# Patient Record
Sex: FEMALE | Race: BLACK OR AFRICAN AMERICAN | HISPANIC OR LATINO | Employment: OTHER | URBAN - METROPOLITAN AREA
[De-identification: names, ages, dates, MRNs, and addresses within clinical notes are randomized per-mention and may not be internally consistent; named-entity substitution may affect disease eponyms.]

---

## 2019-01-11 PROBLEM — L03.90 CELLULITIS: Status: ACTIVE | Noted: 2019-01-11

## 2023-08-09 ENCOUNTER — OFFICE VISIT (OUTPATIENT)
Dept: OBSTETRICS AND GYNECOLOGY | Facility: CLINIC | Age: 33
End: 2023-08-09
Payer: MEDICAID

## 2023-08-09 ENCOUNTER — LAB VISIT (OUTPATIENT)
Dept: LAB | Facility: HOSPITAL | Age: 33
End: 2023-08-09
Attending: STUDENT IN AN ORGANIZED HEALTH CARE EDUCATION/TRAINING PROGRAM
Payer: MEDICAID

## 2023-08-09 VITALS — SYSTOLIC BLOOD PRESSURE: 112 MMHG | BODY MASS INDEX: 56.91 KG/M2 | WEIGHT: 263 LBS | DIASTOLIC BLOOD PRESSURE: 78 MMHG

## 2023-08-09 DIAGNOSIS — O24.112 TYPE 2 DIABETES MELLITUS AFFECTING PREGNANCY IN SECOND TRIMESTER, ANTEPARTUM: ICD-10-CM

## 2023-08-09 DIAGNOSIS — Z59.00 HOMELESS: ICD-10-CM

## 2023-08-09 DIAGNOSIS — N91.0 DELAYED MENSES: ICD-10-CM

## 2023-08-09 DIAGNOSIS — J45.909 ASTHMA, UNSPECIFIED ASTHMA SEVERITY, UNSPECIFIED WHETHER COMPLICATED, UNSPECIFIED WHETHER PERSISTENT: ICD-10-CM

## 2023-08-09 DIAGNOSIS — N91.0 DELAYED MENSES: Primary | ICD-10-CM

## 2023-08-09 LAB
ANION GAP SERPL CALC-SCNC: 13 MMOL/L (ref 8–16)
B-HCG UR QL: POSITIVE
BUN SERPL-MCNC: 5 MG/DL (ref 6–20)
CALCIUM SERPL-MCNC: 9 MG/DL (ref 8.7–10.5)
CHLORIDE SERPL-SCNC: 107 MMOL/L (ref 95–110)
CO2 SERPL-SCNC: 18 MMOL/L (ref 23–29)
CREAT SERPL-MCNC: 0.6 MG/DL (ref 0.5–1.4)
CREAT UR-MCNC: 325 MG/DL (ref 15–325)
CTP QC/QA: YES
EST. GFR  (NO RACE VARIABLE): >60 ML/MIN/1.73 M^2
ESTIMATED AVG GLUCOSE: 143 MG/DL (ref 68–131)
GLUCOSE SERPL-MCNC: 113 MG/DL (ref 70–110)
HBA1C MFR BLD: 6.6 % (ref 4–5.6)
POTASSIUM SERPL-SCNC: 3.5 MMOL/L (ref 3.5–5.1)
PROT UR-MCNC: 16 MG/DL (ref 0–15)
PROT/CREAT UR: 0.05 MG/G{CREAT} (ref 0–0.2)
SODIUM SERPL-SCNC: 138 MMOL/L (ref 136–145)

## 2023-08-09 PROCEDURE — 99203 OFFICE O/P NEW LOW 30 MIN: CPT | Mod: PBBFAC,TH,PN,25 | Performed by: STUDENT IN AN ORGANIZED HEALTH CARE EDUCATION/TRAINING PROGRAM

## 2023-08-09 PROCEDURE — 99204 OFFICE O/P NEW MOD 45 MIN: CPT | Mod: S$PBB,TH,25, | Performed by: STUDENT IN AN ORGANIZED HEALTH CARE EDUCATION/TRAINING PROGRAM

## 2023-08-09 PROCEDURE — 1159F MED LIST DOCD IN RCRD: CPT | Mod: CPTII,,, | Performed by: STUDENT IN AN ORGANIZED HEALTH CARE EDUCATION/TRAINING PROGRAM

## 2023-08-09 PROCEDURE — 1159F PR MEDICATION LIST DOCUMENTED IN MEDICAL RECORD: ICD-10-PCS | Mod: CPTII,,, | Performed by: STUDENT IN AN ORGANIZED HEALTH CARE EDUCATION/TRAINING PROGRAM

## 2023-08-09 PROCEDURE — 86592 SYPHILIS TEST NON-TREP QUAL: CPT | Performed by: STUDENT IN AN ORGANIZED HEALTH CARE EDUCATION/TRAINING PROGRAM

## 2023-08-09 PROCEDURE — 99999PBSHW POCT URINE PREGNANCY: Mod: PBBFAC,,,

## 2023-08-09 PROCEDURE — 87086 URINE CULTURE/COLONY COUNT: CPT | Performed by: STUDENT IN AN ORGANIZED HEALTH CARE EDUCATION/TRAINING PROGRAM

## 2023-08-09 PROCEDURE — 86900 BLOOD TYPING SEROLOGIC ABO: CPT | Performed by: STUDENT IN AN ORGANIZED HEALTH CARE EDUCATION/TRAINING PROGRAM

## 2023-08-09 PROCEDURE — 3074F SYST BP LT 130 MM HG: CPT | Mod: CPTII,,, | Performed by: STUDENT IN AN ORGANIZED HEALTH CARE EDUCATION/TRAINING PROGRAM

## 2023-08-09 PROCEDURE — 76801 OB US < 14 WKS SINGLE FETUS: CPT | Mod: 26,S$PBB,, | Performed by: STUDENT IN AN ORGANIZED HEALTH CARE EDUCATION/TRAINING PROGRAM

## 2023-08-09 PROCEDURE — 3078F DIAST BP <80 MM HG: CPT | Mod: CPTII,,, | Performed by: STUDENT IN AN ORGANIZED HEALTH CARE EDUCATION/TRAINING PROGRAM

## 2023-08-09 PROCEDURE — 87088 URINE BACTERIA CULTURE: CPT | Performed by: STUDENT IN AN ORGANIZED HEALTH CARE EDUCATION/TRAINING PROGRAM

## 2023-08-09 PROCEDURE — 76801 PR US, OB <14WKS, TRANSABD, SINGLE GESTATION: ICD-10-PCS | Mod: 26,S$PBB,, | Performed by: STUDENT IN AN ORGANIZED HEALTH CARE EDUCATION/TRAINING PROGRAM

## 2023-08-09 PROCEDURE — 3074F PR MOST RECENT SYSTOLIC BLOOD PRESSURE < 130 MM HG: ICD-10-PCS | Mod: CPTII,,, | Performed by: STUDENT IN AN ORGANIZED HEALTH CARE EDUCATION/TRAINING PROGRAM

## 2023-08-09 PROCEDURE — 83036 HEMOGLOBIN GLYCOSYLATED A1C: CPT | Performed by: STUDENT IN AN ORGANIZED HEALTH CARE EDUCATION/TRAINING PROGRAM

## 2023-08-09 PROCEDURE — 80048 BASIC METABOLIC PNL TOTAL CA: CPT | Performed by: STUDENT IN AN ORGANIZED HEALTH CARE EDUCATION/TRAINING PROGRAM

## 2023-08-09 PROCEDURE — 86803 HEPATITIS C AB TEST: CPT | Performed by: STUDENT IN AN ORGANIZED HEALTH CARE EDUCATION/TRAINING PROGRAM

## 2023-08-09 PROCEDURE — 82570 ASSAY OF URINE CREATININE: CPT | Performed by: STUDENT IN AN ORGANIZED HEALTH CARE EDUCATION/TRAINING PROGRAM

## 2023-08-09 PROCEDURE — 99999 PR PBB SHADOW E&M-NEW PATIENT-LVL III: CPT | Mod: PBBFAC,,, | Performed by: STUDENT IN AN ORGANIZED HEALTH CARE EDUCATION/TRAINING PROGRAM

## 2023-08-09 PROCEDURE — 99999 PR PBB SHADOW E&M-NEW PATIENT-LVL III: ICD-10-PCS | Mod: PBBFAC,,, | Performed by: STUDENT IN AN ORGANIZED HEALTH CARE EDUCATION/TRAINING PROGRAM

## 2023-08-09 PROCEDURE — 36415 COLL VENOUS BLD VENIPUNCTURE: CPT | Mod: PN | Performed by: STUDENT IN AN ORGANIZED HEALTH CARE EDUCATION/TRAINING PROGRAM

## 2023-08-09 PROCEDURE — 87077 CULTURE AEROBIC IDENTIFY: CPT | Performed by: STUDENT IN AN ORGANIZED HEALTH CARE EDUCATION/TRAINING PROGRAM

## 2023-08-09 PROCEDURE — 3008F PR BODY MASS INDEX (BMI) DOCUMENTED: ICD-10-PCS | Mod: CPTII,,, | Performed by: STUDENT IN AN ORGANIZED HEALTH CARE EDUCATION/TRAINING PROGRAM

## 2023-08-09 PROCEDURE — 85025 COMPLETE CBC W/AUTO DIFF WBC: CPT | Performed by: STUDENT IN AN ORGANIZED HEALTH CARE EDUCATION/TRAINING PROGRAM

## 2023-08-09 PROCEDURE — 87591 N.GONORRHOEAE DNA AMP PROB: CPT | Performed by: STUDENT IN AN ORGANIZED HEALTH CARE EDUCATION/TRAINING PROGRAM

## 2023-08-09 PROCEDURE — 87340 HEPATITIS B SURFACE AG IA: CPT | Performed by: STUDENT IN AN ORGANIZED HEALTH CARE EDUCATION/TRAINING PROGRAM

## 2023-08-09 PROCEDURE — 99999PBSHW POCT URINE PREGNANCY: ICD-10-PCS | Mod: PBBFAC,,,

## 2023-08-09 PROCEDURE — 3078F PR MOST RECENT DIASTOLIC BLOOD PRESSURE < 80 MM HG: ICD-10-PCS | Mod: CPTII,,, | Performed by: STUDENT IN AN ORGANIZED HEALTH CARE EDUCATION/TRAINING PROGRAM

## 2023-08-09 PROCEDURE — 87186 SC STD MICRODIL/AGAR DIL: CPT | Performed by: STUDENT IN AN ORGANIZED HEALTH CARE EDUCATION/TRAINING PROGRAM

## 2023-08-09 PROCEDURE — 81025 URINE PREGNANCY TEST: CPT | Mod: PBBFAC,PN | Performed by: STUDENT IN AN ORGANIZED HEALTH CARE EDUCATION/TRAINING PROGRAM

## 2023-08-09 PROCEDURE — 87389 HIV-1 AG W/HIV-1&-2 AB AG IA: CPT | Performed by: STUDENT IN AN ORGANIZED HEALTH CARE EDUCATION/TRAINING PROGRAM

## 2023-08-09 PROCEDURE — 86762 RUBELLA ANTIBODY: CPT | Performed by: STUDENT IN AN ORGANIZED HEALTH CARE EDUCATION/TRAINING PROGRAM

## 2023-08-09 PROCEDURE — 3008F BODY MASS INDEX DOCD: CPT | Mod: CPTII,,, | Performed by: STUDENT IN AN ORGANIZED HEALTH CARE EDUCATION/TRAINING PROGRAM

## 2023-08-09 PROCEDURE — 76801 OB US < 14 WKS SINGLE FETUS: CPT | Mod: PBBFAC,PN | Performed by: STUDENT IN AN ORGANIZED HEALTH CARE EDUCATION/TRAINING PROGRAM

## 2023-08-09 PROCEDURE — 99204 PR OFFICE/OUTPT VISIT, NEW, LEVL IV, 45-59 MIN: ICD-10-PCS | Mod: S$PBB,TH,25, | Performed by: STUDENT IN AN ORGANIZED HEALTH CARE EDUCATION/TRAINING PROGRAM

## 2023-08-09 RX ORDER — METFORMIN HYDROCHLORIDE 500 MG/1
500 TABLET ORAL
COMMUNITY
End: 2023-10-06 | Stop reason: SDUPTHER

## 2023-08-09 RX ORDER — METFORMIN HYDROCHLORIDE 1000 MG/1
1000 TABLET ORAL 2 TIMES DAILY WITH MEALS
Qty: 180 TABLET | Refills: 3 | Status: ON HOLD | OUTPATIENT
Start: 2023-08-09 | End: 2023-12-14 | Stop reason: HOSPADM

## 2023-08-09 RX ORDER — ASPIRIN 81 MG/1
81 TABLET ORAL DAILY
Qty: 150 TABLET | Refills: 2 | Status: ON HOLD | OUTPATIENT
Start: 2023-08-09 | End: 2023-12-14 | Stop reason: HOSPADM

## 2023-08-09 SDOH — SOCIAL DETERMINANTS OF HEALTH (SDOH): HOMELESSNESS UNSPECIFIED: Z59.00

## 2023-08-09 NOTE — PROGRESS NOTES
CC: Absence of menses    Yary Bermudez is a 33 y.o. female  presents with complaint of absence of menstruation.  She denies nausea/vomIting/abdominal pain/bleeding.  UPT is positive.     Transfer from [place]     Had 1T US with Dr. Garner, NATE 1/15/2024    LMP 4/10/23 [id number] EDD1/15/24    OB hx:   miscarriage   G2 miscarriage   G3 current    PMH: type 2 DM- on metformin 1000mg once daily, asthma, uses inhaler PRN, taking symbicort daily   PSH: tubes in ears   Social: denies any tobacco, Not currently working, FOB involved     Denies any family hx of genetic disorders, chromosomal abnormalities, Neural tube defects, or congenital heart defects.      Denies hx of abnormal pap smear   History reviewed. No pertinent past medical history.  History reviewed. No pertinent surgical history.  Social History     Socioeconomic History    Marital status: Single   Tobacco Use    Smoking status: Never    Smokeless tobacco: Never   Substance and Sexual Activity    Alcohol use: Not Currently    Drug use: Never    Sexual activity: Yes     Family History   Problem Relation Age of Onset    Ovarian cancer Sister     Breast cancer Neg Hx     Uterine cancer Neg Hx     Cervical cancer Neg Hx      OB History    Para Term  AB Living   2       2     SAB IAB Ectopic Multiple Live Births   2              # Outcome Date GA Lbr Skyler/2nd Weight Sex Delivery Anes PTL Lv   2  2016           1 2010               /78   Wt 119.3 kg (263 lb 0.1 oz)   LMP 04/10/2023   BMI 56.91 kg/m²     ROS:  GENERAL: Denies weight gain or weight loss. Feeling well overall.   SKIN: Denies rash or lesions.   HEAD: Denies head injury or headache.   NODES: Denies enlarged lymph nodes.   CHEST: Denies chest pain or shortness of breath.   CARDIOVASCULAR: Denies palpitations or left sided chest pain.   ABDOMEN: No abdominal pain, constipation, diarrhea, nausea, vomiting or rectal bleeding.   URINARY: No frequency,  dysuria, hematuria, or burning on urination.  REPRODUCTIVE: See HPI.   HEMATOLOGIC: No easy bruisability or excessive bleeding.   MUSCULOSKELETAL: Denies joint pain or swelling.   NEUROLOGIC: Denies syncope or weakness.   PSYCHIATRIC: Denies depression, anxiety or mood swings.    PE:   APPEARANCE: Well nourished, well developed, in no acute distress.  AFFECT: WNL, alert and oriented x 3.  SKIN: No acne or hirsutism.  NECK: Neck symmetric without masses or thyromegaly.  NODES: No inguinal, cervical, axillary or femoral lymph node enlargement.  CHEST: Good respiratory effort.   ABDOMEN: Soft. No tenderness or masses. No hepatosplenomegaly. No hernias.      ULTRASOUND:   Ultrasound performed in the usual fashion, showing viable garza intrauterine pregnancy,FHTS 156, FL consistent with 17w1d  No free fluid in cul-de-sac or adnexal pathology.    ASSESSMENT and PLAN:    ICD-10-CM ICD-9-CM    1. Delayed menses  N91.0 626.8 POCT urine pregnancy      HIV 1/2 Ag/Ab (4th Gen)      RPR      Hepatitis B Surface Antigen      Type & Screen - Ob Profile      Rubella Antibody, IgG      Urine culture      C. trachomatis/N. gonorrhoeae by AMP DNA      CBC Auto Differential      Hepatitis C Antibody      2. Type 2 diabetes mellitus affecting pregnancy in second trimester, antepartum  O24.112 648.03 Protein/Creatinine Ratio, Urine     250.00 EKG 12-lead      Hemoglobin A1C      BASIC METABOLIC PANEL      Ambulatory referral/consult to Perinatology      Prenatal Miscellaneous Test, Blood      3. Asthma, unspecified asthma severity, unspecified whether complicated, unspecified whether persistent  J45.909 493.90 Ambulatory referral/consult to Perinatology      4. BMI 50.0-59.9, adult  Z68.43 V85.43 Ambulatory referral/consult to Perinatology      5. Homeless  Z59.00 V60.0 Ambulatory referral/consult to Social Work          Orders Placed This Encounter   Procedures    Urine culture    C. trachomatis/N. gonorrhoeae by AMP DNA     Order  Specific Question:   Source:     Answer:   Urine    HIV 1/2 Ag/Ab (4th Gen)     Standing Status:   Future     Standing Expiration Date:   8/9/2024     Order Specific Question:   Release to patient     Answer:   Immediate    RPR     Standing Status:   Future     Standing Expiration Date:   8/9/2024     Order Specific Question:   Release to patient     Answer:   Immediate    Hepatitis B Surface Antigen     Standing Status:   Future     Standing Expiration Date:   8/9/2024    Rubella Antibody, IgG     Standing Status:   Future     Standing Expiration Date:   8/9/2024    CBC Auto Differential     Standing Status:   Future     Standing Expiration Date:   8/9/2024    Hepatitis C Antibody     Standing Status:   Future     Standing Expiration Date:   10/7/2024     Order Specific Question:   Release to patient     Answer:   Immediate    Protein/Creatinine Ratio, Urine     Specimen Source->Urine     Order Specific Question:   Specimen Source     Answer:   Urine    Hemoglobin A1C     Standing Status:   Future     Standing Expiration Date:   10/7/2024    BASIC METABOLIC PANEL     Standing Status:   Future     Standing Expiration Date:   10/7/2024    Prenatal Miscellaneous Test, Blood     Standing Status:   Future     Standing Expiration Date:   10/7/2024     Order Specific Question:   Misc. Genetics Test Name     Answer:   unity    Ambulatory referral/consult to Perinatology     Standing Status:   Future     Standing Expiration Date:   9/9/2024     Referral Priority:   Routine     Referral Type:   Consultation     Referral Reason:   Specialty Services Required     Requested Specialty:   Maternal and Fetal Medicine     Number of Visits Requested:   1    Ambulatory referral/consult to Social Work     Standing Status:   Future     Standing Expiration Date:   9/9/2024     Referral Priority:   Routine     Referral Type:   Consultation     Referral Reason:   Specialty Services Required     Requested Specialty:   Licensed Clinical       Number of Visits Requested:   1    POCT urine pregnancy    EKG 12-lead     Standing Status:   Future     Standing Expiration Date:   8/9/2024    Type & Screen - Ob Profile     Standing Status:   Future     Standing Expiration Date:   9/8/2023           - Patient was counseled today on proper weight gain based on the [employer]'s recommendations based on her pre-pregnancy weight. Discussed foods to avoid in pregnancy (i.e. sushi, fish that are high in mercury, deli meat, and unpasteurized cheeses). Discussed prenatal vitamin options (i.e. stool softener, DHA). Discussed genetic screening, patient desires, discussed genetic carrier screening, patient desires  - 1T labs ordered  - pap due pp   - US performed today in clinic   - NIPT today    Type 2 DM  - baseline p/c ratio, EKG ordered  - Hemoglobin a1c  - discussed risk of uncontrolled DM including birth defects, FGR, macrosomnia, fetal demise  - her fastings she says are 100-120, post pradinals 140-150, doesn't have log with her.   - discussed optimal BG control  - increase meformin to 1000mgBID for now, reassess BG in 2 weeks  - referral to [facility] for growth monitoring and level 2US  - aspirin started    BMI > 50  - [facility] US for anatomy    Asthma  - continue symbicort  - doesn't have peak flow meter, will arrange    Homeless  - will place  consult     No follow-ups on file.

## 2023-08-10 LAB
ABO + RH BLD: NORMAL
BASOPHILS # BLD AUTO: 0.03 K/UL (ref 0–0.2)
BASOPHILS NFR BLD: 0.3 % (ref 0–1.9)
BLD GP AB SCN CELLS X3 SERPL QL: NORMAL
DIFFERENTIAL METHOD: ABNORMAL
EOSINOPHIL # BLD AUTO: 0.1 K/UL (ref 0–0.5)
EOSINOPHIL NFR BLD: 1.1 % (ref 0–8)
ERYTHROCYTE [DISTWIDTH] IN BLOOD BY AUTOMATED COUNT: 13.6 % (ref 11.5–14.5)
HBV SURFACE AG SERPL QL IA: NORMAL
HCT VFR BLD AUTO: 34.8 % (ref 37–48.5)
HCV AB SERPL QL IA: NORMAL
HGB BLD-MCNC: 11.5 G/DL (ref 12–16)
HIV 1+2 AB+HIV1 P24 AG SERPL QL IA: NORMAL
IMM GRANULOCYTES # BLD AUTO: 0.05 K/UL (ref 0–0.04)
IMM GRANULOCYTES NFR BLD AUTO: 0.5 % (ref 0–0.5)
LYMPHOCYTES # BLD AUTO: 2.7 K/UL (ref 1–4.8)
LYMPHOCYTES NFR BLD: 24.2 % (ref 18–48)
MCH RBC QN AUTO: 26.9 PG (ref 27–31)
MCHC RBC AUTO-ENTMCNC: 33 G/DL (ref 32–36)
MCV RBC AUTO: 82 FL (ref 82–98)
MONOCYTES # BLD AUTO: 0.6 K/UL (ref 0.3–1)
MONOCYTES NFR BLD: 5.6 % (ref 4–15)
NEUTROPHILS # BLD AUTO: 7.6 K/UL (ref 1.8–7.7)
NEUTROPHILS NFR BLD: 68.3 % (ref 38–73)
NRBC BLD-RTO: 0 /100 WBC
PLATELET # BLD AUTO: 308 K/UL (ref 150–450)
PMV BLD AUTO: 9.7 FL (ref 9.2–12.9)
RBC # BLD AUTO: 4.27 M/UL (ref 4–5.4)
RPR SER QL: NORMAL
RUBV IGG SER-ACNC: <5 IU/ML
RUBV IGG SER-IMP: ABNORMAL
WBC # BLD AUTO: 11.09 K/UL (ref 3.9–12.7)

## 2023-08-11 ENCOUNTER — TELEPHONE (OUTPATIENT)
Dept: OBSTETRICS AND GYNECOLOGY | Facility: CLINIC | Age: 33
End: 2023-08-11
Payer: MEDICAID

## 2023-08-11 DIAGNOSIS — O24.112 TYPE 2 DIABETES MELLITUS AFFECTING PREGNANCY IN SECOND TRIMESTER, ANTEPARTUM: Primary | ICD-10-CM

## 2023-08-11 LAB
BACTERIA UR CULT: ABNORMAL
C TRACH DNA SPEC QL NAA+PROBE: NOT DETECTED
N GONORRHOEA DNA SPEC QL NAA+PROBE: NOT DETECTED

## 2023-08-11 RX ORDER — INSULIN PUMP SYRINGE, 3 ML
EACH MISCELLANEOUS
Qty: 1 EACH | Refills: 0 | Status: SHIPPED | OUTPATIENT
Start: 2023-08-11 | End: 2023-11-13 | Stop reason: SDUPTHER

## 2023-08-11 RX ORDER — LANCETS
1 EACH MISCELLANEOUS 4 TIMES DAILY
Qty: 200 EACH | Refills: 3 | Status: ON HOLD | OUTPATIENT
Start: 2023-08-11 | End: 2023-12-14 | Stop reason: HOSPADM

## 2023-08-11 RX ORDER — NITROFURANTOIN 25; 75 MG/1; MG/1
100 CAPSULE ORAL 2 TIMES DAILY
Qty: 14 CAPSULE | Refills: 0 | Status: SHIPPED | OUTPATIENT
Start: 2023-08-11 | End: 2023-08-18
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
## 2023-08-17 PROBLEM — O24.112 TYPE 2 DIABETES MELLITUS AFFECTING PREGNANCY IN SECOND TRIMESTER, ANTEPARTUM: Status: ACTIVE | Noted: 2023-08-17

## 2023-08-17 PROBLEM — O99.212 OBESITY AFFECTING PREGNANCY IN SECOND TRIMESTER: Status: ACTIVE | Noted: 2023-08-17

## 2023-08-17 PROBLEM — O09.72: Status: ACTIVE | Noted: 2023-08-17

## 2023-09-15 ENCOUNTER — TELEPHONE (OUTPATIENT)
Dept: OBSTETRICS AND GYNECOLOGY | Facility: CLINIC | Age: 33
End: 2023-09-15
Payer: MEDICAID

## 2023-09-15 NOTE — TELEPHONE ENCOUNTER
----- Message from Geovanna Lenz sent at 9/15/2023  9:58 AM CDT -----  Type:  Appointment Request    Caller is requesting an appointment.      Name of Caller:  Pt    Symptoms:  pregnant    Would the patient rather a call back or a response via [facility]?  Call back    Best Call Back Number:  [phone]    Additional Information:  Pt was confirmed pregnant by Dr Lobato but since they are so close in due date she suggested her to see someone else in office and the pt was recommended by her sister who sees Dr Pascual.   Please call back to advise. Thanks!

## 2023-10-06 ENCOUNTER — CLINICAL SUPPORT (OUTPATIENT)
Dept: PEDIATRIC CARDIOLOGY | Facility: CLINIC | Age: 33
End: 2023-10-06
Attending: OBSTETRICS & GYNECOLOGY
Payer: MEDICAID

## 2023-10-06 ENCOUNTER — OFFICE VISIT (OUTPATIENT)
Dept: PEDIATRIC CARDIOLOGY | Facility: CLINIC | Age: 33
End: 2023-10-06
Attending: PEDIATRICS
Payer: MEDICAID

## 2023-10-06 VITALS
HEIGHT: 57 IN | WEIGHT: 260.81 LBS | DIASTOLIC BLOOD PRESSURE: 67 MMHG | SYSTOLIC BLOOD PRESSURE: 105 MMHG | BODY MASS INDEX: 56.27 KG/M2 | HEART RATE: 70 BPM

## 2023-10-06 DIAGNOSIS — O99.212 SEVERE OBESITY DUE TO EXCESS CALORIES AFFECTING PREGNANCY IN SECOND TRIMESTER: ICD-10-CM

## 2023-10-06 DIAGNOSIS — E66.01 SEVERE OBESITY DUE TO EXCESS CALORIES AFFECTING PREGNANCY IN SECOND TRIMESTER: ICD-10-CM

## 2023-10-06 DIAGNOSIS — O24.112 TYPE 2 DIABETES MELLITUS AFFECTING PREGNANCY IN SECOND TRIMESTER, ANTEPARTUM: ICD-10-CM

## 2023-10-06 DIAGNOSIS — O24.112 TYPE 2 DIABETES MELLITUS AFFECTING PREGNANCY IN SECOND TRIMESTER, ANTEPARTUM: Primary | ICD-10-CM

## 2023-10-06 DIAGNOSIS — O35.BXX0 PREGNANCY COMPLICATED BY FETAL CONGENITAL HEART DISEASE, SINGLE OR UNSPECIFIED FETUS: ICD-10-CM

## 2023-10-06 PROCEDURE — 99999 PR PBB SHADOW E&M-EST. PATIENT-LVL III: CPT | Mod: PBBFAC,,, | Performed by: PEDIATRICS

## 2023-10-06 PROCEDURE — 99999 PR PBB SHADOW E&M-EST. PATIENT-LVL III: ICD-10-PCS | Mod: PBBFAC,,, | Performed by: PEDIATRICS

## 2023-10-06 PROCEDURE — 93325 DOPPLER ECHO COLOR FLOW MAPG: CPT | Mod: 26,S$PBB,, | Performed by: PEDIATRICS

## 2023-10-06 PROCEDURE — 99203 OFFICE O/P NEW LOW 30 MIN: CPT | Mod: 25,S$PBB,, | Performed by: PEDIATRICS

## 2023-10-06 PROCEDURE — 76825 ECHO EXAM OF FETAL HEART: CPT | Mod: PBBFAC | Performed by: PEDIATRICS

## 2023-10-06 PROCEDURE — 3008F PR BODY MASS INDEX (BMI) DOCUMENTED: ICD-10-PCS | Mod: CPTII,,, | Performed by: PEDIATRICS

## 2023-10-06 PROCEDURE — 76827 ECHO EXAM OF FETAL HEART: CPT | Mod: 26,S$PBB,, | Performed by: PEDIATRICS

## 2023-10-06 PROCEDURE — 3044F PR MOST RECENT HEMOGLOBIN A1C LEVEL <7.0%: ICD-10-PCS | Mod: CPTII,,, | Performed by: PEDIATRICS

## 2023-10-06 PROCEDURE — 3008F BODY MASS INDEX DOCD: CPT | Mod: CPTII,,, | Performed by: PEDIATRICS

## 2023-10-06 PROCEDURE — 3074F SYST BP LT 130 MM HG: CPT | Mod: CPTII,,, | Performed by: PEDIATRICS

## 2023-10-06 PROCEDURE — 3044F HG A1C LEVEL LT 7.0%: CPT | Mod: CPTII,,, | Performed by: PEDIATRICS

## 2023-10-06 PROCEDURE — 3074F PR MOST RECENT SYSTOLIC BLOOD PRESSURE < 130 MM HG: ICD-10-PCS | Mod: CPTII,,, | Performed by: PEDIATRICS

## 2023-10-06 PROCEDURE — 99213 OFFICE O/P EST LOW 20 MIN: CPT | Mod: PBBFAC | Performed by: PEDIATRICS

## 2023-10-06 PROCEDURE — 99203 PR OFFICE/OUTPT VISIT, NEW, LEVL III, 30-44 MIN: ICD-10-PCS | Mod: 25,S$PBB,, | Performed by: PEDIATRICS

## 2023-10-06 PROCEDURE — 76825 PR  SO2 FETAL HEART: ICD-10-PCS | Mod: 26,S$PBB,, | Performed by: PEDIATRICS

## 2023-10-06 PROCEDURE — 76825 ECHO EXAM OF FETAL HEART: CPT | Mod: 26,S$PBB,, | Performed by: PEDIATRICS

## 2023-10-06 PROCEDURE — 93325 PR DOPPLER COLOR FLOW VELOCITY MAP: ICD-10-PCS | Mod: 26,S$PBB,, | Performed by: PEDIATRICS

## 2023-10-06 PROCEDURE — 3078F PR MOST RECENT DIASTOLIC BLOOD PRESSURE < 80 MM HG: ICD-10-PCS | Mod: CPTII,,, | Performed by: PEDIATRICS

## 2023-10-06 PROCEDURE — 3078F DIAST BP <80 MM HG: CPT | Mod: CPTII,,, | Performed by: PEDIATRICS

## 2023-10-06 PROCEDURE — 76827 ECHO EXAM OF FETAL HEART: CPT | Mod: PBBFAC | Performed by: PEDIATRICS

## 2023-10-06 PROCEDURE — 1159F PR MEDICATION LIST DOCUMENTED IN MEDICAL RECORD: ICD-10-PCS | Mod: CPTII,,, | Performed by: PEDIATRICS

## 2023-10-06 PROCEDURE — 93325 DOPPLER ECHO COLOR FLOW MAPG: CPT | Mod: PBBFAC | Performed by: PEDIATRICS

## 2023-10-06 PROCEDURE — 1159F MED LIST DOCD IN RCRD: CPT | Mod: CPTII,,, | Performed by: PEDIATRICS

## 2023-10-06 PROCEDURE — 76827 PR  SO2 FETAL HEART DOPPLER: ICD-10-PCS | Mod: 26,S$PBB,, | Performed by: PEDIATRICS

## 2023-10-06 RX ORDER — BUDESONIDE AND FORMOTEROL FUMARATE DIHYDRATE 80; 4.5 UG/1; UG/1
2 AEROSOL RESPIRATORY (INHALATION) 2 TIMES DAILY
COMMUNITY
Start: 2023-09-14

## 2023-10-06 RX ORDER — NEBULIZER AND COMPRESSOR
EACH MISCELLANEOUS
COMMUNITY
Start: 2023-09-14

## 2023-10-06 RX ORDER — ASCORBIC ACID, CHOLECALCIFEROL, DL-.ALPHA.-TOCOPHEROL ACETATE, THIAMINE HYDROCHLORIDE, RIBOFLAVIN, NIACINAMIDE, PYRIDOXINE HYDROCHLORIDE, FOLIC ACID, CYANOCOBALAMIN, CALCIUM CARBONATE, FERROUS FUMARATE, ZINC OXIDE, CUPRIC SULFATE 100; 400; 30; 1.6; 1.6; 20; 3.1; 1; 12; 200; 27; 10; 2 MG/1; [IU]/1; [IU]/1; MG/1; MG/1; MG/1; MG/1; MG/1; UG/1; MG/1; MG/1; MG/1; MG/1
1 TABLET, COATED ORAL
COMMUNITY
Start: 2023-05-30

## 2023-10-06 RX ORDER — ALBUTEROL SULFATE 0.83 MG/ML
2.5 SOLUTION RESPIRATORY (INHALATION) EVERY 6 HOURS PRN
COMMUNITY
Start: 2023-09-14

## 2023-10-06 NOTE — PROGRESS NOTES
[facility] Maternal Fetal Medicine ([place]) Fetal Cardiology Clinic    Today, I had the pleasure of evaluating Charissa Bermudez who is now 33 y.o. and carrying her third pregnancy at 24 4/7 weeks gestation with an NATE of 1/15/24. She was referred for evaluation of the fetal heart due to pre-existing diabetes mellitus and suboptimal views of the fetal heart on MFM evaluation.     She is carrying a male fetus, to be named Elie.  Pregnancy thus far has been otherwise uncomplicated.     Obstetric History:    .  Her OB history is notable for two prior SABs    Past Medical History:   Diagnosis Date    ADHD          Current Outpatient Medications:     albuterol (PROVENTIL) 2.5 mg /3 mL (0.083 %) nebulizer solution, Inhale 2.5 mg into the lungs every 6 (six) hours as needed., Disp: , Rfl:     aspirin (ECOTRIN) 81 MG EC tablet, Take 1 tablet (81 mg total) by mouth once daily., Disp: 150 tablet, Rfl: 2    blood sugar diagnostic Strp, 1 each by Misc.(Non-Drug; Combo Route) route 4 (four) times daily., Disp: 200 each, Rfl: 3    blood-glucose meter kit, Use as instructed, Disp: 1 each, Rfl: 0    budesonide-formoterol 80-4.5 mcg (SYMBICORT) 80-4.5 mcg/actuation HFAA, Inhale 2 puffs into the lungs 2 (two) times daily., Disp: , Rfl:     lancets (LANCETS,THIN) Misc, 1 each by Misc.(Non-Drug; Combo Route) route 4 (four) times daily., Disp: 200 each, Rfl: 3    metFORMIN (GLUCOPHAGE) 1000 MG tablet, Take 1 tablet (1,000 mg total) by mouth 2 (two) times daily with meals., Disp: 180 tablet, Rfl: 3    nebulizer and compressor Ellie, Use as directed, Disp: , Rfl:     prenatal vit 91/iron/folic/dha (PRENATAL + DHA ORAL), Take by mouth., Disp: , Rfl:     budesonide/formoterol fumarate (SYMBICORT INHL), Inhale into the lungs., Disp: , Rfl:     metFORMIN (GLUCOPHAGE) 500 MG tablet, Take 500 mg by mouth., Disp: , Rfl:      27 mg iron- 1 mg Tab, Take 1 tablet by mouth., Disp: , Rfl:      Review of patient's allergies indicates:    Allergen Reactions    Shellfish containing products        Family History: Negative for congenital heart disease, early coronary artery disease, sudden unexplained death, connective tissues disorders, genetic syndromes, multiple miscarriages or other congenital anomalies.    Social History: Ms. Charissa Bermudez is in a relationship with the father of the baby.  The father of the baby is involved.     FETAL ECHOCARDIOGRAM (summary):  Fetal echo at 25 4/7wga. NATE 1/15/24.   Technically difficult study  Normal segmental anatomy.   There is mild tricuspid valve insufficiency.   Cannot rule out ventricular septal defect.  Normal right and left ventricular structure and size.   Normal biventricular systolic function.  No pericardial effusion.  There is no pericardial effusion.  (Full report in electronic medical record)    Impression:  Single active male fetus at 25 4/7 wga.  The fetal echocardiogram is notable for normal segmental anatomy.  There is mild tricuspid valve insufficiency.  The right and left ventricles appear normal in size and function.  The study was technically difficult. Some images were concerning for ventricular septal defect, however can not rule out ultrasound drop out.     I discussed with her that fetal echocardiography is insufficiently sensitive to rule out all septal defects, anomalies of pulmonary and systemic veins, arch anomalies, and some valvar abnormalities, nor can it ensure that the ductus arteriosus and foramen ovale will spontaneously close.     Recommendations:  Location, timing, and mode of delivery will be determined by the obstetrical team.  Based on the difficulty in obtaining adequate images of the fetus, I do not feel that fetal cardiology follow-up will be beneficial.  I am happy to see her again however if concerns arise.     The patient is planning to deliver at [place].  I recommend a  echocardiogram of the infant ideally at greater than 24 hours of age  and prior to hospital discharge.  Sooner if there are concerns.           Daphney Sharpe MD, MSCI  Pediatric Cardiology  Pediatric Echocardiography, Fetal Echocardiography, Cardiac MRI  [facility] [address] [place]  [address]  Phone [phone], Fax [phone]        The above information was discussed in detail including the use of diagrams, with 30 minutes of total face to face time, with greater than 50% with counseling and coordination of care.  The discussion of the diagnosis and treatment options is as described above.

## 2023-10-26 ENCOUNTER — ROUTINE PRENATAL (OUTPATIENT)
Dept: OBSTETRICS AND GYNECOLOGY | Facility: CLINIC | Age: 33
End: 2023-10-26
Payer: MEDICAID

## 2023-10-26 VITALS — DIASTOLIC BLOOD PRESSURE: 62 MMHG | WEIGHT: 265 LBS | BODY MASS INDEX: 57.33 KG/M2 | SYSTOLIC BLOOD PRESSURE: 98 MMHG

## 2023-10-26 DIAGNOSIS — Z3A.28 28 WEEKS GESTATION OF PREGNANCY: Primary | ICD-10-CM

## 2023-10-26 LAB
BILIRUBIN, UA POC OHS: NEGATIVE
BLOOD, UA POC OHS: NEGATIVE
CLARITY, UA POC OHS: CLEAR
COLOR, UA POC OHS: YELLOW
GLUCOSE, UA POC OHS: NEGATIVE
KETONES, UA POC OHS: ABNORMAL
LEUKOCYTES, UA POC OHS: ABNORMAL
NITRITE, UA POC OHS: NEGATIVE
PH, UA POC OHS: 7
PROTEIN, UA POC OHS: NEGATIVE
SPECIFIC GRAVITY, UA POC OHS: 1.02
UROBILINOGEN, UA POC OHS: 0.2

## 2023-10-26 PROCEDURE — 99999 PR PBB SHADOW E&M-EST. PATIENT-LVL III: ICD-10-PCS | Mod: PBBFAC,,, | Performed by: OBSTETRICS & GYNECOLOGY

## 2023-10-26 PROCEDURE — 99214 OFFICE O/P EST MOD 30 MIN: CPT | Mod: TH,S$PBB,, | Performed by: OBSTETRICS & GYNECOLOGY

## 2023-10-26 PROCEDURE — 81003 URINALYSIS AUTO W/O SCOPE: CPT | Mod: PBBFAC,PN | Performed by: OBSTETRICS & GYNECOLOGY

## 2023-10-26 PROCEDURE — 99999 PR PBB SHADOW E&M-EST. PATIENT-LVL III: CPT | Mod: PBBFAC,,, | Performed by: OBSTETRICS & GYNECOLOGY

## 2023-10-26 PROCEDURE — 99213 OFFICE O/P EST LOW 20 MIN: CPT | Mod: PBBFAC,PN | Performed by: OBSTETRICS & GYNECOLOGY

## 2023-10-26 PROCEDURE — 99214 PR OFFICE/OUTPT VISIT, EST, LEVL IV, 30-39 MIN: ICD-10-PCS | Mod: TH,S$PBB,, | Performed by: OBSTETRICS & GYNECOLOGY

## 2023-10-26 PROCEDURE — 99999PBSHW POCT URINALYSIS(INSTRUMENT): Mod: PBBFAC,,,

## 2023-10-26 PROCEDURE — 99999PBSHW POCT URINALYSIS(INSTRUMENT): ICD-10-PCS | Mod: PBBFAC,,,

## 2023-10-26 NOTE — PROGRESS NOTES
The patient presents with No complaints.   Reports: Good fetal movements reported, No Bleeding or pains    10/26/2023  33 y.o. 28w3d Estimated Date of Delivery: 1/15/24, dating reviewed.   OB History    Para Term  AB Living   3 0 0 0 2     SAB IAB Ectopic Multiple Live Births   2 0 0          # Outcome Date GA Lbr Skyler/2nd Weight Sex Delivery Anes PTL Lv   3 Current            2 2016           1 2010               Prenatal labs reviewed and updated today    Review of Systems:  General ROS: negative for headache or visual changes  Breast ROS: negative for breast lumps  Gastrointestinal ROS: negative for  constipation, diarrhea or nausea/vomiting  Musculoskeletal ROS: negative for pain in joints or swelling in face or hands.   Neurological ROS: negative for - headaches, numbness/tingling or visual changes      Physical Exam:  BP 98/62   Wt 120.2 kg (264 lb 15.9 oz)   LMP 04/10/2023   BMI 57.33 kg/m²   Urine Dip: Pending  Fundal Height: 30cm  Fetal Heart Tones: 147bpm  Constitutional: She is oriented to person, place, and time. She appears well-developed and well-nourished. No distress. Overweight   Cardiovascular: Normal rate.    Pulmonary/Chest: Effort normal. No respiratory distress  Abdominal: Soft, gravid, nontender. No rebound and no guarding.     Genitourinary: Deferred    Musculoskeletal: Normal range of motion, Minimal peripheral edema.   Neurological: She is alert and oriented to person, place, and time. Coordination normal.   Skin: Skin is warm and dry. She is not diaphoretic.  Psychiatric: She has a normal mood and affect.        Assessment:  33 y.o., at 28w3d Gestation   Patient Active Problem List   Diagnosis    Cellulitis    Type 2 diabetes mellitus affecting pregnancy in second trimester, antepartum    Obesity affecting pregnancy in second trimester    Encounter for supervision of high risk pregnancy due to social problem in second trimester, antepartum    Possible fetal VSD  - infant will need  echo     Current Outpatient Medications on File Prior to Visit   Medication Sig Dispense Refill    albuterol (PROVENTIL) 2.5 mg /3 mL (0.083 %) nebulizer solution Inhale 2.5 mg into the lungs every 6 (six) hours as needed.      aspirin (ECOTRIN) 81 MG EC tablet Take 1 tablet (81 mg total) by mouth once daily. 150 tablet 2    blood sugar diagnostic Strp 1 each by Misc.(Non-Drug; Combo Route) route 4 (four) times daily. 200 each 3    blood-glucose meter kit Use as instructed 1 each 0    budesonide-formoterol 80-4.5 mcg (SYMBICORT) 80-4.5 mcg/actuation HFAA Inhale 2 puffs into the lungs 2 (two) times daily.      budesonide/formoterol fumarate (SYMBICORT INHL) Inhale into the lungs.      lancets (LANCETS,THIN) Misc 1 each by Misc.(Non-Drug; Combo Route) route 4 (four) times daily. 200 each 3    metFORMIN (GLUCOPHAGE) 1000 MG tablet Take 1 tablet (1,000 mg total) by mouth 2 (two) times daily with meals. 180 tablet 3    nebulizer and compressor Ellie Use as directed      prenatal vit 91/iron/folic/dha (PRENATAL + DHA ORAL) Take by mouth.       27 mg iron- 1 mg Tab Take 1 tablet by mouth.       No current facility-administered medications on file prior to visit.         Plan:   Labs today: none  Orders today: glucola 2 weeks  MEds today: none  Procedures Today: none  Follow up 2 Weeks, bleeding/pain precautions ,  kick counts, labor precautions

## 2023-11-10 ENCOUNTER — ROUTINE PRENATAL (OUTPATIENT)
Dept: OBSTETRICS AND GYNECOLOGY | Facility: CLINIC | Age: 33
End: 2023-11-10
Payer: MEDICAID

## 2023-11-10 ENCOUNTER — LAB VISIT (OUTPATIENT)
Dept: LAB | Facility: HOSPITAL | Age: 33
End: 2023-11-10
Attending: OBSTETRICS & GYNECOLOGY
Payer: MEDICAID

## 2023-11-10 VITALS
SYSTOLIC BLOOD PRESSURE: 110 MMHG | WEIGHT: 265.88 LBS | DIASTOLIC BLOOD PRESSURE: 60 MMHG | BODY MASS INDEX: 57.52 KG/M2

## 2023-11-10 DIAGNOSIS — Z3A.30 30 WEEKS GESTATION OF PREGNANCY: Primary | ICD-10-CM

## 2023-11-10 DIAGNOSIS — Z3A.28 28 WEEKS GESTATION OF PREGNANCY: ICD-10-CM

## 2023-11-10 DIAGNOSIS — O36.63X0 EXCESSIVE FETAL GROWTH AFFECTING MANAGEMENT OF PREGNANCY IN THIRD TRIMESTER, SINGLE OR UNSPECIFIED FETUS: ICD-10-CM

## 2023-11-10 LAB
BILIRUBIN, UA POC OHS: NEGATIVE
BLOOD, UA POC OHS: NEGATIVE
CLARITY, UA POC OHS: CLEAR
COLOR, UA POC OHS: YELLOW
ERYTHROCYTE [DISTWIDTH] IN BLOOD BY AUTOMATED COUNT: 12.3 % (ref 11.5–14.5)
GLUCOSE SERPL-MCNC: 225 MG/DL (ref 70–140)
GLUCOSE, UA POC OHS: NEGATIVE
HCT VFR BLD AUTO: 32.4 % (ref 37–48.5)
HGB BLD-MCNC: 10.6 G/DL (ref 12–16)
IRON SERPL-MCNC: 26 UG/DL (ref 30–160)
KETONES, UA POC OHS: NEGATIVE
LEUKOCYTES, UA POC OHS: ABNORMAL
MCH RBC QN AUTO: 26.7 PG (ref 27–31)
MCHC RBC AUTO-ENTMCNC: 32.7 G/DL (ref 32–36)
MCV RBC AUTO: 82 FL (ref 82–98)
NITRITE, UA POC OHS: NEGATIVE
PH, UA POC OHS: 6
PLATELET # BLD AUTO: 287 K/UL (ref 150–450)
PMV BLD AUTO: 10 FL (ref 9.2–12.9)
PROTEIN, UA POC OHS: ABNORMAL
RBC # BLD AUTO: 3.97 M/UL (ref 4–5.4)
SATURATED IRON: 5 % (ref 20–50)
SPECIFIC GRAVITY, UA POC OHS: >=1.03
TOTAL IRON BINDING CAPACITY: 493 UG/DL (ref 250–450)
TRANSFERRIN SERPL-MCNC: 333 MG/DL (ref 200–375)
UROBILINOGEN, UA POC OHS: 1
WBC # BLD AUTO: 9.69 K/UL (ref 3.9–12.7)

## 2023-11-10 PROCEDURE — 36415 COLL VENOUS BLD VENIPUNCTURE: CPT | Mod: PN | Performed by: OBSTETRICS & GYNECOLOGY

## 2023-11-10 PROCEDURE — 99999PBSHW POCT URINALYSIS(INSTRUMENT): Mod: PBBFAC,,,

## 2023-11-10 PROCEDURE — 99213 OFFICE O/P EST LOW 20 MIN: CPT | Mod: TH,S$PBB,, | Performed by: OBSTETRICS & GYNECOLOGY

## 2023-11-10 PROCEDURE — 87086 URINE CULTURE/COLONY COUNT: CPT | Performed by: OBSTETRICS & GYNECOLOGY

## 2023-11-10 PROCEDURE — 83540 ASSAY OF IRON: CPT | Performed by: OBSTETRICS & GYNECOLOGY

## 2023-11-10 PROCEDURE — 99999 PR PBB SHADOW E&M-EST. PATIENT-LVL III: CPT | Mod: PBBFAC,,, | Performed by: OBSTETRICS & GYNECOLOGY

## 2023-11-10 PROCEDURE — 85027 COMPLETE CBC AUTOMATED: CPT | Performed by: OBSTETRICS & GYNECOLOGY

## 2023-11-10 PROCEDURE — 99999PBSHW POCT URINALYSIS(INSTRUMENT): ICD-10-PCS | Mod: PBBFAC,,,

## 2023-11-10 PROCEDURE — 81003 URINALYSIS AUTO W/O SCOPE: CPT | Mod: PBBFAC,PN | Performed by: OBSTETRICS & GYNECOLOGY

## 2023-11-10 PROCEDURE — 82950 GLUCOSE TEST: CPT | Performed by: OBSTETRICS & GYNECOLOGY

## 2023-11-10 PROCEDURE — 99999 PR PBB SHADOW E&M-EST. PATIENT-LVL III: ICD-10-PCS | Mod: PBBFAC,,, | Performed by: OBSTETRICS & GYNECOLOGY

## 2023-11-10 PROCEDURE — 84466 ASSAY OF TRANSFERRIN: CPT | Performed by: OBSTETRICS & GYNECOLOGY

## 2023-11-10 PROCEDURE — 99213 PR OFFICE/OUTPT VISIT, EST, LEVL III, 20-29 MIN: ICD-10-PCS | Mod: TH,S$PBB,, | Performed by: OBSTETRICS & GYNECOLOGY

## 2023-11-10 PROCEDURE — 99213 OFFICE O/P EST LOW 20 MIN: CPT | Mod: PBBFAC,TH,PN | Performed by: OBSTETRICS & GYNECOLOGY

## 2023-11-10 NOTE — PROGRESS NOTES
The patient presents with No complaints.   Reports: Good fetal movements reported, No Bleeding or pains    11/10/2023  33 y.o. 30w4d Estimated Date of Delivery: 1/15/24, dating reviewed.   OB History    Para Term  AB Living   3 0 0 0 2     SAB IAB Ectopic Multiple Live Births   2 0 0          # Outcome Date GA Lbr Skyler/2nd Weight Sex Delivery Anes PTL Lv   3 Current            2 2016           1 2010               Prenatal labs reviewed and updated today    Review of Systems:  General ROS: negative for headache or visual changes  Breast ROS: negative for breast lumps  Gastrointestinal ROS: negative for  constipation, diarrhea or nausea/vomiting  Musculoskeletal ROS: negative for pain in joints or swelling in face or hands.   Neurological ROS: negative for - headaches, numbness/tingling or visual changes      Physical Exam:  /60   Wt 120.6 kg (265 lb 14 oz)   LMP 04/10/2023   BMI 57.52 kg/m²   Urine Dip: Pending  Fundal Height:35cm  Fetal Heart Tones: 138bpm  Constitutional: She is oriented to person, place, and time. She appears well-developed and well-nourished. No distress. Overweight   Cardiovascular: Normal rate.    Pulmonary/Chest: Effort normal. No respiratory distress  Abdominal: Soft, gravid, nontender. No rebound and no guarding.     Genitourinary: Deferred     Musculoskeletal: Normal range of motion, Minimal peripheral edema.   Neurological: She is alert and oriented to person, place, and time. Coordination normal.   Skin: Skin is warm and dry. She is not diaphoretic.  Psychiatric: She has a normal mood and affect.        Assessment:  33 y.o., at 30w4d Gestation   Patient Active Problem List   Diagnosis    Cellulitis    Type 2 diabetes mellitus affecting pregnancy in second trimester, antepartum    Obesity affecting pregnancy in second trimester    Encounter for supervision of high risk pregnancy due to social problem in second trimester, antepartum    Possible fetal VSD  - infant will need  echo     Current Outpatient Medications on File Prior to Visit   Medication Sig Dispense Refill    albuterol (PROVENTIL) 2.5 mg /3 mL (0.083 %) nebulizer solution Inhale 2.5 mg into the lungs every 6 (six) hours as needed.      aspirin (ECOTRIN) 81 MG EC tablet Take 1 tablet (81 mg total) by mouth once daily. 150 tablet 2    blood sugar diagnostic Strp 1 each by Misc.(Non-Drug; Combo Route) route 4 (four) times daily. 200 each 3    blood-glucose meter kit Use as instructed 1 each 0    budesonide-formoterol 80-4.5 mcg (SYMBICORT) 80-4.5 mcg/actuation HFAA Inhale 2 puffs into the lungs 2 (two) times daily.      budesonide/formoterol fumarate (SYMBICORT INHL) Inhale into the lungs.      lancets (LANCETS,THIN) Misc 1 each by Misc.(Non-Drug; Combo Route) route 4 (four) times daily. 200 each 3    metFORMIN (GLUCOPHAGE) 1000 MG tablet Take 1 tablet (1,000 mg total) by mouth 2 (two) times daily with meals. 180 tablet 3    nebulizer and compressor Ellie Use as directed      prenatal vit 91/iron/folic/dha (PRENATAL + DHA ORAL) Take by mouth.       27 mg iron- 1 mg Tab Take 1 tablet by mouth.       No current facility-administered medications on file prior to visit.         Plan:   Labs today: glucola today  Orders today: none  MEds today: none  Procedures Today: u/s 2 weeks for S>De  Follow up 2 Weeks, bleeding/pain precautions , kick counts, labor precautions

## 2023-11-11 LAB
BACTERIA UR CULT: NORMAL
BACTERIA UR CULT: NORMAL

## 2023-11-13 ENCOUNTER — PATIENT MESSAGE (OUTPATIENT)
Dept: OBSTETRICS AND GYNECOLOGY | Facility: CLINIC | Age: 33
End: 2023-11-13
Payer: MEDICAID

## 2023-11-13 ENCOUNTER — TELEPHONE (OUTPATIENT)
Dept: OBSTETRICS AND GYNECOLOGY | Facility: CLINIC | Age: 33
End: 2023-11-13

## 2023-11-13 DIAGNOSIS — O24.415 GESTATIONAL DIABETES MELLITUS (GDM) IN THIRD TRIMESTER CONTROLLED ON ORAL HYPOGLYCEMIC DRUG: Primary | ICD-10-CM

## 2023-11-13 RX ORDER — GLYBURIDE 2.5 MG/1
2.5 TABLET ORAL 2 TIMES DAILY WITH MEALS
Qty: 60 TABLET | Refills: 3 | Status: SHIPPED | OUTPATIENT
Start: 2023-11-13 | End: 2023-12-05

## 2023-11-13 RX ORDER — INSULIN PUMP SYRINGE, 3 ML
EACH MISCELLANEOUS
Qty: 1 EACH | Refills: 0 | Status: ON HOLD | OUTPATIENT
Start: 2023-11-13 | End: 2023-12-14 | Stop reason: HOSPADM

## 2023-11-13 NOTE — TELEPHONE ENCOUNTER
Glucola 225 and iron def anemia.    Recommend diabteic teaching and fating and 2 hour after meals blood sugar testing.   Referral in for diabetic teaching.   Start glyburide twice a day.   Prescription sent in to pharmacy

## 2023-11-17 ENCOUNTER — TELEPHONE (OUTPATIENT)
Dept: OBSTETRICS AND GYNECOLOGY | Facility: CLINIC | Age: 33
End: 2023-11-17
Payer: MEDICAID

## 2023-11-17 NOTE — TELEPHONE ENCOUNTER
----- Message from Fiona Lewis sent at 11/17/2023 10:54 AM CST -----  Regarding: Concerned about baby  Good morning,    Patient is concerned about her baby has possibly dropped and she is experiencing pressure in her pelvis and with walking. She needs some clinical advice. Please follow up with patient. Thanks so much!

## 2023-11-17 NOTE — TELEPHONE ENCOUNTER
Spoke with patient. Feeling a lot of pressure in hips and back for 2 days. No spotting or bleeding. Baby is moving frequently. No pain/burning with urination. Recommend: Rest, elevate feet,  drink plenty of fluids, if any changes/worsens to ER for eval. Patient verb understanding.

## 2023-11-28 ENCOUNTER — HOSPITAL ENCOUNTER (OUTPATIENT)
Dept: RADIOLOGY | Facility: HOSPITAL | Age: 33
Discharge: HOME | End: 2023-11-28
Payer: MEDICAID

## 2023-11-28 ENCOUNTER — ROUTINE PRENATAL (OUTPATIENT)
Dept: OBSTETRICS AND GYNECOLOGY | Facility: CLINIC | Age: 33
End: 2023-11-28
Payer: MEDICAID

## 2023-11-28 VITALS
SYSTOLIC BLOOD PRESSURE: 104 MMHG | BODY MASS INDEX: 58.76 KG/M2 | DIASTOLIC BLOOD PRESSURE: 62 MMHG | WEIGHT: 271.63 LBS

## 2023-11-28 DIAGNOSIS — O36.63X0 EXCESSIVE FETAL GROWTH AFFECTING MANAGEMENT OF PREGNANCY IN THIRD TRIMESTER, SINGLE OR UNSPECIFIED FETUS: ICD-10-CM

## 2023-11-28 DIAGNOSIS — Z3A.30 30 WEEKS GESTATION OF PREGNANCY: ICD-10-CM

## 2023-11-28 DIAGNOSIS — Z3A.33 33 WEEKS GESTATION OF PREGNANCY: Primary | ICD-10-CM

## 2023-11-28 LAB
BILIRUBIN, UA POC OHS: NEGATIVE
BLOOD, UA POC OHS: ABNORMAL
CLARITY, UA POC OHS: CLEAR
COLOR, UA POC OHS: YELLOW
GLUCOSE, UA POC OHS: NEGATIVE
KETONES, UA POC OHS: ABNORMAL
LEUKOCYTES, UA POC OHS: ABNORMAL
NITRITE, UA POC OHS: NEGATIVE
PH, UA POC OHS: 6.5
PROTEIN, UA POC OHS: ABNORMAL
SPECIFIC GRAVITY, UA POC OHS: 1.02
UROBILINOGEN, UA POC OHS: 1

## 2023-11-28 PROCEDURE — 59025 FETAL NON-STRESS TEST: CPT | Mod: PBBFAC,PN | Performed by: OBSTETRICS & GYNECOLOGY

## 2023-11-28 PROCEDURE — 81003 URINALYSIS AUTO W/O SCOPE: CPT | Mod: PBBFAC,PN | Performed by: OBSTETRICS & GYNECOLOGY

## 2023-11-28 PROCEDURE — 76816 OB US FOLLOW-UP PER FETUS: CPT | Mod: TC,PN

## 2023-11-28 PROCEDURE — 59025 PR FETAL 2N-STRESS TEST: ICD-10-PCS | Mod: 26,S$PBB,, | Performed by: OBSTETRICS & GYNECOLOGY

## 2023-11-28 PROCEDURE — 99999PBSHW POCT URINALYSIS(INSTRUMENT): ICD-10-PCS | Mod: PBBFAC,,,

## 2023-11-28 PROCEDURE — 99212 OFFICE O/P EST SF 10 MIN: CPT | Mod: PBBFAC,PN,25 | Performed by: OBSTETRICS & GYNECOLOGY

## 2023-11-28 PROCEDURE — 99999PBSHW POCT URINALYSIS(INSTRUMENT): Mod: PBBFAC,,,

## 2023-11-28 PROCEDURE — 76816 OB US FOLLOW-UP PER FETUS: CPT | Mod: 26,,, | Performed by: RADIOLOGY

## 2023-11-28 PROCEDURE — 99999 PR PBB SHADOW E&M-EST. PATIENT-LVL II: ICD-10-PCS | Mod: PBBFAC,,, | Performed by: OBSTETRICS & GYNECOLOGY

## 2023-11-28 PROCEDURE — 76816 US OB FOLLOW UP EA GESTATION TRANSABDOMINAL: ICD-10-PCS | Mod: 26,,, | Performed by: RADIOLOGY

## 2023-11-28 PROCEDURE — 99999 PR PBB SHADOW E&M-EST. PATIENT-LVL II: CPT | Mod: PBBFAC,,, | Performed by: OBSTETRICS & GYNECOLOGY

## 2023-11-28 PROCEDURE — 59025 FETAL NON-STRESS TEST: CPT | Mod: 26,S$PBB,, | Performed by: OBSTETRICS & GYNECOLOGY

## 2023-11-28 PROCEDURE — 87086 URINE CULTURE/COLONY COUNT: CPT | Performed by: OBSTETRICS & GYNECOLOGY

## 2023-11-28 PROCEDURE — 99213 OFFICE O/P EST LOW 20 MIN: CPT | Mod: TH,25,S$PBB, | Performed by: OBSTETRICS & GYNECOLOGY

## 2023-11-28 PROCEDURE — 99213 PR OFFICE/OUTPT VISIT, EST, LEVL III, 20-29 MIN: ICD-10-PCS | Mod: TH,25,S$PBB, | Performed by: OBSTETRICS & GYNECOLOGY

## 2023-11-28 NOTE — PROGRESS NOTES
The patient presents with No complaints   Bs fating 90s-100s  Postprandials 120s. .   Reports: Good fetal movements reported, No Bleeding or pains    2023  33 y.o. 33w1d Estimated Date of Delivery: 1/15/24, dating reviewed.   OB History    Para Term  AB Living   3 0 0 0 2     SAB IAB Ectopic Multiple Live Births   2 0 0          # Outcome Date GA Lbr Skyler/2nd Weight Sex Delivery Anes PTL Lv   3 Current            2  2016           1 2010               Prenatal labs reviewed and updated today    Review of Systems:  General ROS: negative for headache or visual changes  Breast ROS: negative for breast lumps  Gastrointestinal ROS: negative for  constipation, diarrhea or nausea/vomiting  Musculoskeletal ROS: negative for pain in joints or swelling in face or hands.   Neurological ROS: negative for - headaches, numbness/tingling or visual changes      Physical Exam:  /62   Wt 123.2 kg (271 lb 9.7 oz)   LMP 04/10/2023   BMI 58.76 kg/m²   Urine Dip: Pending  Fundal Height:38cm  Fetal Heart Tones: 142bpm  Constitutional: She is oriented to person, place, and time. She appears well-developed and well-nourished. No distress. Overweight   Cardiovascular: Normal rate.    Pulmonary/Chest: Effort normal. No respiratory distress  Abdominal: Soft, gravid, nontender. No rebound and no guarding.     Genitourinary: Deferred    Musculoskeletal: Normal range of motion, Minimal peripheral edema.   Neurological: She is alert and oriented to person, place, and time. Coordination normal.   Skin: Skin is warm and dry. She is not diaphoretic.  Psychiatric: She has a normal mood and affect.        Assessment:  33 y.o., at 33w1d Gestation   Patient Active Problem List   Diagnosis    Cellulitis    Type 2 diabetes mellitus affecting pregnancy in second trimester, antepartum    Obesity affecting pregnancy in second trimester    Encounter for supervision of high risk pregnancy due to social problem in second  trimester, antepartum    Possible fetal VSD - infant will need  echo     Current Outpatient Medications on File Prior to Visit   Medication Sig Dispense Refill    albuterol (PROVENTIL) 2.5 mg /3 mL (0.083 %) nebulizer solution Inhale 2.5 mg into the lungs every 6 (six) hours as needed.      aspirin (ECOTRIN) 81 MG EC tablet Take 1 tablet (81 mg total) by mouth once daily. 150 tablet 2    blood sugar diagnostic Strp 1 each by Misc.(Non-Drug; Combo Route) route 4 (four) times daily. 200 each 3    blood-glucose meter kit Use as instructed 1 each 0    budesonide-formoterol 80-4.5 mcg (SYMBICORT) 80-4.5 mcg/actuation HFAA Inhale 2 puffs into the lungs 2 (two) times daily.      budesonide/formoterol fumarate (SYMBICORT INHL) Inhale into the lungs.      glyBURIDE (DIABETA) 2.5 MG tablet Take 1 tablet (2.5 mg total) by mouth 2 (two) times daily with meals. 60 tablet 3    lancets (LANCETS,THIN) Misc 1 each by Misc.(Non-Drug; Combo Route) route 4 (four) times daily. 200 each 3    metFORMIN (GLUCOPHAGE) 1000 MG tablet Take 1 tablet (1,000 mg total) by mouth 2 (two) times daily with meals. 180 tablet 3    nebulizer and compressor Ellie Use as directed      prenatal vit 91/iron/folic/dha (PRENATAL + DHA ORAL) Take by mouth.       27 mg iron- 1 mg Tab Take 1 tablet by mouth.       No current facility-administered medications on file prior to visit.         Plan:   Labs today: none  Orders today: none  MEds today: none  Procedures Today: weekly NST, u/s today  Follow up 1 Weeks, bleeding/pain precautions ,  kick counts, labor precautions

## 2023-11-28 NOTE — PROCEDURES
Procedures    FETAL SURVEILLANCE TESTING SUMMARY      INDICATIONS:  gestational diabetes mellitus    Fetal doppler heart rate tracing obtained in the usual fashion with the patient in the left supine position.    Duration of monitorinmin    OBJECTIVE RESULTS:  Baseline fetal heart rate: 140bpm    Fetal heart variability: moderate  Fetal Heart Rate decelerations: none  Fetal acoustic stimulator: no  Fetal Heart Rate accelerations: yes  Fetal Non-stress Test: reactive    Fetal surveillance: reassuring        Nathaniel Pascual M.D., FACOG

## 2023-11-29 LAB
BACTERIA UR CULT: NORMAL
BACTERIA UR CULT: NORMAL

## 2023-12-05 ENCOUNTER — LAB VISIT (OUTPATIENT)
Dept: LAB | Facility: HOSPITAL | Age: 33
End: 2023-12-05
Attending: OBSTETRICS & GYNECOLOGY
Payer: MEDICAID

## 2023-12-05 ENCOUNTER — ROUTINE PRENATAL (OUTPATIENT)
Dept: OBSTETRICS AND GYNECOLOGY | Facility: CLINIC | Age: 33
End: 2023-12-05
Payer: MEDICAID

## 2023-12-05 VITALS
BODY MASS INDEX: 59.52 KG/M2 | DIASTOLIC BLOOD PRESSURE: 68 MMHG | SYSTOLIC BLOOD PRESSURE: 110 MMHG | WEIGHT: 275.13 LBS

## 2023-12-05 DIAGNOSIS — Z3A.34 34 WEEKS GESTATION OF PREGNANCY: Primary | ICD-10-CM

## 2023-12-05 DIAGNOSIS — Z3A.33 33 WEEKS GESTATION OF PREGNANCY: ICD-10-CM

## 2023-12-05 LAB
ALBUMIN SERPL BCP-MCNC: 2.4 G/DL (ref 3.5–5.2)
ALP SERPL-CCNC: 65 U/L (ref 55–135)
ALT SERPL W/O P-5'-P-CCNC: 5 U/L (ref 10–44)
ANION GAP SERPL CALC-SCNC: 7 MMOL/L (ref 8–16)
AST SERPL-CCNC: 5 U/L (ref 10–40)
BILIRUB SERPL-MCNC: 0.2 MG/DL (ref 0.1–1)
BILIRUBIN, UA POC OHS: NEGATIVE
BLOOD, UA POC OHS: NEGATIVE
BUN SERPL-MCNC: 5 MG/DL (ref 6–20)
CALCIUM SERPL-MCNC: 8.4 MG/DL (ref 8.7–10.5)
CHLORIDE SERPL-SCNC: 109 MMOL/L (ref 95–110)
CLARITY, UA POC OHS: CLEAR
CO2 SERPL-SCNC: 22 MMOL/L (ref 23–29)
COLOR, UA POC OHS: YELLOW
CREAT SERPL-MCNC: 0.7 MG/DL (ref 0.5–1.4)
ERYTHROCYTE [DISTWIDTH] IN BLOOD BY AUTOMATED COUNT: 12.3 % (ref 11.5–14.5)
EST. GFR  (NO RACE VARIABLE): >60 ML/MIN/1.73 M^2
ESTIMATED AVG GLUCOSE: 151 MG/DL (ref 68–131)
GLUCOSE SERPL-MCNC: 127 MG/DL (ref 70–110)
GLUCOSE, UA POC OHS: NEGATIVE
HBA1C MFR BLD: 6.9 % (ref 4–5.6)
HCT VFR BLD AUTO: 32.2 % (ref 37–48.5)
HGB BLD-MCNC: 10.8 G/DL (ref 12–16)
KETONES, UA POC OHS: NEGATIVE
LEUKOCYTES, UA POC OHS: ABNORMAL
MCH RBC QN AUTO: 25.5 PG (ref 27–31)
MCHC RBC AUTO-ENTMCNC: 33.5 G/DL (ref 32–36)
MCV RBC AUTO: 76 FL (ref 82–98)
NITRITE, UA POC OHS: NEGATIVE
PH, UA POC OHS: 7
PLATELET # BLD AUTO: 296 K/UL (ref 150–450)
PMV BLD AUTO: 10 FL (ref 9.2–12.9)
POTASSIUM SERPL-SCNC: 3.7 MMOL/L (ref 3.5–5.1)
PROT SERPL-MCNC: 6.3 G/DL (ref 6–8.4)
PROTEIN, UA POC OHS: NEGATIVE
RBC # BLD AUTO: 4.24 M/UL (ref 4–5.4)
SODIUM SERPL-SCNC: 138 MMOL/L (ref 136–145)
SPECIFIC GRAVITY, UA POC OHS: 1.01
UROBILINOGEN, UA POC OHS: 1
WBC # BLD AUTO: 11.58 K/UL (ref 3.9–12.7)

## 2023-12-05 PROCEDURE — 99999 PR PBB SHADOW E&M-EST. PATIENT-LVL III: ICD-10-PCS | Mod: PBBFAC,,, | Performed by: OBSTETRICS & GYNECOLOGY

## 2023-12-05 PROCEDURE — 80053 COMPREHEN METABOLIC PANEL: CPT | Performed by: OBSTETRICS & GYNECOLOGY

## 2023-12-05 PROCEDURE — 99999PBSHW POCT URINALYSIS(INSTRUMENT): Mod: PBBFAC,,,

## 2023-12-05 PROCEDURE — 36415 COLL VENOUS BLD VENIPUNCTURE: CPT | Mod: PN | Performed by: OBSTETRICS & GYNECOLOGY

## 2023-12-05 PROCEDURE — 83036 HEMOGLOBIN GLYCOSYLATED A1C: CPT | Performed by: OBSTETRICS & GYNECOLOGY

## 2023-12-05 PROCEDURE — 81003 URINALYSIS AUTO W/O SCOPE: CPT | Mod: PBBFAC,PN | Performed by: OBSTETRICS & GYNECOLOGY

## 2023-12-05 PROCEDURE — 99999 PR PBB SHADOW E&M-EST. PATIENT-LVL III: CPT | Mod: PBBFAC,,, | Performed by: OBSTETRICS & GYNECOLOGY

## 2023-12-05 PROCEDURE — 85027 COMPLETE CBC AUTOMATED: CPT | Performed by: OBSTETRICS & GYNECOLOGY

## 2023-12-05 PROCEDURE — 86592 SYPHILIS TEST NON-TREP QUAL: CPT | Performed by: OBSTETRICS & GYNECOLOGY

## 2023-12-05 PROCEDURE — 99999PBSHW POCT URINALYSIS(INSTRUMENT): ICD-10-PCS | Mod: PBBFAC,,,

## 2023-12-05 PROCEDURE — 99213 PR OFFICE/OUTPT VISIT, EST, LEVL III, 20-29 MIN: ICD-10-PCS | Mod: TH,25,S$PBB, | Performed by: OBSTETRICS & GYNECOLOGY

## 2023-12-05 PROCEDURE — 99213 OFFICE O/P EST LOW 20 MIN: CPT | Mod: TH,25,S$PBB, | Performed by: OBSTETRICS & GYNECOLOGY

## 2023-12-05 PROCEDURE — 99213 OFFICE O/P EST LOW 20 MIN: CPT | Mod: PBBFAC,PN | Performed by: OBSTETRICS & GYNECOLOGY

## 2023-12-05 NOTE — PROCEDURES
Procedures    FETAL SURVEILLANCE TESTING SUMMARY    INDICATIONS:  gestational diabetes mellitus    Fetal doppler heart rate tracing obtained in the usual fashion with the patient in the left supine position.    Duration of monitorinmin    OBJECTIVE RESULTS:  Baseline fetal heart rate: 130bpm    Fetal heart variability: moderate  Fetal Heart Rate decelerations: none  Fetal acoustic stimulator: yes  Fetal Heart Rate accelerations: yes  Fetal Non-stress Test: reactive    Fetal surveillance: reassuring        Nathaniel Pascual M.D., FACOG

## 2023-12-06 LAB — RPR SER QL: NORMAL

## 2023-12-08 ENCOUNTER — PATIENT MESSAGE (OUTPATIENT)
Dept: OBSTETRICS AND GYNECOLOGY | Facility: CLINIC | Age: 33
End: 2023-12-08
Payer: MEDICAID

## 2023-12-08 RX ORDER — GLYBURIDE 2.5 MG/1
2.5 TABLET ORAL 2 TIMES DAILY WITH MEALS
Qty: 180 TABLET | Refills: 3 | Status: ON HOLD | OUTPATIENT
Start: 2023-12-08 | End: 2023-12-14 | Stop reason: HOSPADM

## 2023-12-11 ENCOUNTER — PATIENT MESSAGE (OUTPATIENT)
Dept: OTHER | Facility: OTHER | Age: 33
End: 2023-12-11
Payer: MEDICAID

## 2023-12-15 ENCOUNTER — TELEPHONE (OUTPATIENT)
Dept: OBSTETRICS AND GYNECOLOGY | Facility: CLINIC | Age: 33
End: 2023-12-15
Payer: MEDICAID

## 2023-12-15 NOTE — TELEPHONE ENCOUNTER
----- Message from Tyrell Ross sent at 12/15/2023  7:45 AM CST -----  Type:  Sooner Appointment Request    Caller is requesting a sooner appointment.  Caller declined first available appointment listed below.  Caller will not accept being placed on the waitlist and is requesting a message be sent to doctor.    Name of Caller:  pt  When is the first available appointment?  N/A  Symptoms:  OB f/u  Would the patient rather a call back or a response via [facility]? Call  Best Call Back Number: [phone]  Additional Information:  pt needs to be seen by 12/19. Please call back to advise. Thanks!

## 2023-12-20 ENCOUNTER — POSTPARTUM VISIT (OUTPATIENT)
Dept: OBSTETRICS AND GYNECOLOGY | Facility: CLINIC | Age: 33
End: 2023-12-20
Payer: MEDICAID

## 2023-12-20 VITALS
WEIGHT: 261.94 LBS | HEIGHT: 57 IN | BODY MASS INDEX: 56.51 KG/M2 | SYSTOLIC BLOOD PRESSURE: 124 MMHG | DIASTOLIC BLOOD PRESSURE: 80 MMHG

## 2023-12-20 DIAGNOSIS — F41.9 ANXIETY: Primary | ICD-10-CM

## 2023-12-20 PROCEDURE — 59430 PR CARE AFTER DELIVERY ONLY: ICD-10-PCS | Mod: ,,, | Performed by: OBSTETRICS & GYNECOLOGY

## 2023-12-20 PROCEDURE — 99213 OFFICE O/P EST LOW 20 MIN: CPT | Mod: PBBFAC,PN | Performed by: OBSTETRICS & GYNECOLOGY

## 2023-12-20 PROCEDURE — 99999 PR PBB SHADOW E&M-EST. PATIENT-LVL III: ICD-10-PCS | Mod: PBBFAC,,, | Performed by: OBSTETRICS & GYNECOLOGY

## 2023-12-20 PROCEDURE — 99999 PR PBB SHADOW E&M-EST. PATIENT-LVL III: CPT | Mod: PBBFAC,,, | Performed by: OBSTETRICS & GYNECOLOGY

## 2023-12-20 RX ORDER — SERTRALINE HYDROCHLORIDE 50 MG/1
50 TABLET, FILM COATED ORAL DAILY
Qty: 30 TABLET | Refills: 5 | Status: SHIPPED | OUTPATIENT
Start: 2023-12-20 | End: 2024-12-19

## 2023-12-20 NOTE — PROGRESS NOTES
"  Patient here for 2 week postpartum exam without complaint. Breast feeding.   She is s/p Spontaneous vaginal Delivery  - PPROM  She has no mood complaints. Anxiety- no Suicidal or homicidal Ideation   Minimal lochia.   No pain.    PE:  /80   Ht 4' 9" (1.448 m)   Wt 118.8 kg (261 lb 14.5 oz)   LMP 04/10/2023   BMI 56.68 kg/m²   Constitutional: She is oriented to person, place, and time. She appears well-developed and well-nourished. No distress.   HENT:   Head: Normocephalic and atraumatic.   Eyes: Conjunctivae and EOM are normal. No scleral icterus.   Neck: Normal range of motion. Neck supple. No tracheal deviation present.   Cardiovascular: Normal rate.    Pulmonary/Chest: Effort normal. No respiratory distress. She exhibits no tenderness.  Breasts: def  Abdominal: Soft. She exhibits no distension and no mass. There is no tenderness. There is no rebound and no guarding.   Genitourinary: deferred  Musculoskeletal: Normal range of motion.   Neurological: She is alert and oriented to person, place, and time. Coordination normal.   Skin: Skin is warm and dry. She is not diaphoretic.   Psychiatric: She has a normal mood and affect.      Assessment:  2 week interval PP exam, doing well  anxiety    Plan:  Zoloft for anxiety  Follow up 4 weeks for pelvic and Birth control    "